# Patient Record
(demographics unavailable — no encounter records)

---

## 2025-07-11 NOTE — ASSESSMENT
[FreeTextEntry1] : Pt is a 29 y/o F 2 weeks s/p laparoscopic cholecystectomy who presents today via TEB feeling well here for post op care.  Pt denies any fevers, chest pn, sob, n,v,c,d, or abd pn since sx. Pt states she has been walking daily for exercise and has been tolerating her usual diet without any concerns. Pt understands we do not recommend lifting over 10 lbs x 6 weeks after sx. Reviewed low fat diet recommendations which pt understands and has been following. Path wnl. Pt states her incisions are healing well and denies any pain, bleeding, or oozing. No other concerns at this time.